# Patient Record
Sex: MALE | Race: OTHER | URBAN - METROPOLITAN AREA
[De-identification: names, ages, dates, MRNs, and addresses within clinical notes are randomized per-mention and may not be internally consistent; named-entity substitution may affect disease eponyms.]

---

## 2018-03-22 ENCOUNTER — OFFICE VISIT (OUTPATIENT)
Dept: FAMILY MEDICINE CLINIC | Age: 33
End: 2018-03-22

## 2018-03-22 VITALS
BODY MASS INDEX: 28.49 KG/M2 | TEMPERATURE: 97.7 F | SYSTOLIC BLOOD PRESSURE: 111 MMHG | WEIGHT: 188 LBS | DIASTOLIC BLOOD PRESSURE: 81 MMHG | HEART RATE: 58 BPM | HEIGHT: 68 IN

## 2018-03-22 DIAGNOSIS — G51.0 BELL'S PALSY: Primary | ICD-10-CM

## 2018-03-22 NOTE — PROGRESS NOTES
AVS printed and reviewed. Pt shows meds from [facility] ordered on 3/14/18 - Prednisone 20 mg empty bottle, Acyclovir 1 gm with 3 tabs left, eye drops and eye ointment. No refills ordered. F/u as needed or if symptoms worsen or do not improve. Discussion assisted by CAV , Saman Marin.

## 2018-03-22 NOTE — MR AVS SNAPSHOT
[address] [place] [address] 
[phone] Patient: Naila Gurrola MRN: [id number] Visit Information Catalina Mahoney y Kaylene Personal Médico Departamento Teléfono del Dep. Número de visita 3/22/2018  9:30 AM ELISHA Cobb [phone] Follow-up Instructions Return if symptoms worsen or fail to improve. Upcoming Health Maintenance Date Due DTaP/Tdap/Td series (1 - Tdap) 1/31/2008 Influenza Age 5 to Adult 8/1/2017 Alergias  Review Complete El: 3/22/2018 Por: Kayleigh Alejandre A partir del:  3/22/2018 No Known Allergies Vacunas actuales Rexine Smart No hay ninguna vacuna archivada. No revisadas esta visita You Were Diagnosed With   
  
 Laney Nunez 's palsy    -  Primary ICD-10-CM: G51.0 ICD-9-CM: 351.0 Partes vitales PS Pulso Temperatura [place] ( percentil de crecimiento) Peso (percentil de crecimiento) BMI (IM)  
 111/81 (BP 1 Location: Right arm) (!) 58 97.7 °F (36.5 °C) (Oral) 5' 8.11\" (1.73 m) 188 lb (85.3 kg) 28.49 kg/m2 Estatus de tabaquísmo Former Smoker Historial de signos vitales BMI and BSA Data Body Mass Index Body Surface Area  
 28.49 kg/m 2 2.02 m 2 [facility] Name Phone [address] [place] [phone] Hall lista de medicamentos actualizada Aviso  As of 3/22/2018 10:26 AM  
 No se le ha recetado ningún medicamento. Instrucciones de seguimiento Return if symptoms worsen or fail to improve. Instrucciones para el Paciente Parálisis facial de : Instrucciones de cuidado - [ Bell's Palsy: Care Instructions ] Instrucciones de cuidado La parálisis facial de  es christopher parálisis o debilitamiento de los músculos de un lado de la pérez. Las personas con parálisis facial de  suelen tener caído un lado de la boca y les lefty trabajo cerrar por completo el lexus de addison mismo lado. La parálisis facial de  puede interferir también con el sentido del gusto. [place] sucede cuando se inflama un nervio de la pérez. La causa de la parálisis facial de  no es un ataque cerebral. No se conoce la causa de esta inflamación del nervio. Anyi algunos expertos piensan que la causa podría ser un virus. Debido a esto, en ocasiones los médicos recetan un medicamento antiviral para tratarla. También podrían darle medicamentos para reducir la hinchazón. La parálisis facial de  por lo general mejora por sí marc en algunas semanas o meses. La atención de seguimiento es christopher parte clave de lay tratamiento y seguridad. Asegúrese de hacer y acudir a todas las citas, y llame a lay médico si está teniendo problemas. También es christopher buena idea saber los resultados de los exámenes y mantener christopher lista de los medicamentos que huang. Cómo puede cuidarse en el hogar? · [employer] medicamentos exactamente lonnie le fueron recetados. Llame a lay médico si bakari estar teniendo problemas con lay medicamento. Recibirá [employer] medicamentos específicos recetados por lay médico. 
· Use lágrimas artificiales o pomada si se le secan demasiado los ojos. La parálisis facial de  puede causar la caída del párpado inferior, lo que produce sequedad en el lexus. · Si no puede cerrar el lexus por completo, piense en usar un parche para dormir. · Ayúdese a parpadear usando un dedo para cerrar y abrir el párpado. [place] podría ayudar a mantener el lexus húmedo. · Use anteojos o gafas para prevenir que el polvo y la ulices entren en el lexus. · A medida que recupera la sensación en la pérez, masajee la frente, las mejillas y los labios. El masaje podría fortalecer los músculos de la pérez. · Cepíllese los dientes y use hilo dental con frecuencia para ayudar a prevenir las caries. La parálisis facial de  puede secar la saliva en un lado de lay boca. [place] aumenta el riesgo de caries. Cuándo debe pedir ayuda? Llame al 911 en cualquier momento que considere que necesita atención de [place]. Por ejemplo, llame si: 
? · Tiene síntomas de un ataque cerebral. Estos pueden incluir: 
¨ Entumecimiento, hormigueo, debilidad o parálisis repentinos en la pérez, el brazo o la pierna, sobre todo si ocurre en un solo lado del cuerpo. ¨ Cambios súbitos en la vista. ¨ Problemas repentinos para hablar. ¨ Confusión súbita o dificultad repentina para comprender frases sencillas. ¨ Problemas repentinos para caminar o mantener el equilibrio. ¨ Un dolor de monet intenso y repentino, distinto a los estrella de monet anteriores. ?Llame a lay médico ahora mismo o busque atención médica inmediata si: 
? · Siente entumecimiento o debilidad que se expande más allá de un lado de la pérez. ? · Tiene un salpullido en la piel o dolor o enrojecimiento en el lexus, o le [employer]. ? · Tiene nuevo dolor de monet o ines empeora. ?Preste especial atención a los cambios en lay funmi y asegúrese de comunicarse con lay médico si: 
? · No mejora lonnie se esperaba. Dónde puede encontrar más información en inglés? [place] a [email or web address] Kay Cruz P168 en la búsqueda para aprender más acerca de \"Parálisis facial de : Instrucciones de cuidado - [ Bell's Palsy: Care Instructions ]. \" 
Revisado: 14 octubre, 2016 Versión del contenido: 11.4 © 5337-4236 Healthwise, [email or web address]. Las instrucciones de cuidado fueron adaptadas bajo licencia por Good [employer]ctions (which disclaims liability or warranty for this information). Si usted tiene [place] afección médica o sobre estas instrucciones, siempre pregunte a lay profesional de funmi.  [employer], [email or web address] niega toda garantía o responsabilidad por lay uso de esta información. Introducing [address] [facility] introduce portal paciente MyChart . Ahora se puede acceder a partes de lay expediente médico, enviar por correo electrónico la oficina de lay médico y solicitar renovaciones de medicamentos en línea. En lay navegador de Internet , Stiven Handy a [email or web address] Evelio clic en el usuario por Lazaro Albert? Raza Moreno clic aquí en la sesión Koreen Handsome. Verá la página de registro [place]. Ingrese lay código de [facility] tom y lonnie aparece a continuación. Usted no tendrá que [employer] código después de keily completado el proceso de registro . Si usted no se inscribe antes de la fecha de caducidad , debe solicitar un nuevo código. · MyChart Código de acceso : [id number] Expires: 6/20/2018 10:06 AM 
 
Ingresa los últimos cuatro dígitos de lay Número de Seguro Social ( xxxx ) y fecha de nacimiento ( dd / mm / aaaa ) lonnie se indica y evelio clic en Enviar. Usted será llevado a la siguiente página de registro . Crear un ID MyChart . Esta será lay ID de inicio de sesión de MyChart y no puede ser [place] , por lo que pensar en christopher que es Jhonathan Carp y fácil de recordar . Crear christopher contraseña MyChart . Usted puede cambiar lay contraseña en cualquier momento . Ingrese lay Password Reset de preguntas y [place] . Rodanthe se puede utilizar en un momento posterior si usted olvida lay contraseña. Introduzca lay dirección de correo electrónico . Tera Crandall recibirá christopher notificación por correo electrónico cuando la nueva información está disponible en MyChart . Angella cruz en Registrarse. Coretha Bible mercedes y descargar porciones de lay expediente médico. 
Eevlio anthony en el enlace de descarga del menú Resumen para descargar christopher copia portátil de lay información médica . Si tiene [employer] , por favor visite la sección de preguntas frecuentes del sitio web MyChart .  Recuerde, MyChart NO es que se utilizará para las [employer]. Para emergencias médicas , llame al 911 . Ahora disponible en lay iPhone y Android ! Por favor proporcione ines resumen de la documentación de cuidado a lay próximo proveedor. If you have any questions after today's visit, please call [phone].

## 2018-03-22 NOTE — PROGRESS NOTES
Assessment/Plan:    Diagnoses and all orders for this visit:    1. Bell's palsy    He has eye patch and eye drops  Discussed natural course of this problem  F/up if not improved after 1 month from onset of sxs (today is day 8)    Follow-up Disposition:  Return if symptoms worsen or fail to improve. MITALI Beaulieu expressed understanding of this plan. An AVS was printed and given to the patient.      ----------------------------------------------------------------------    Chief Complaint   Patient presents with    Facial Droop     pt c/o pain and facial droop since Monday. Pt went to ED and is f/u on this problem. History of Present Illness:  Pt here for f/up on bells palsy after ER visit. sxs started 8 days ago. Started upon awakening in the morning and realizing that he could not smile normally. Left side of face with droop. Had all the appropriate tx and recommendations at the ER. Here bc he does not see improvement in his sxs yet. He has eye patch for sleeping and eye drops for the day time. He has been careful chewing       No past medical history on file. No Known Allergies    Social History   Substance Use Topics    Smoking status: Former Smoker     Types: Cigarettes    Smokeless tobacco: Never Used    Alcohol use Yes      Comment: occ       No family history on file. Physical Exam:     Visit Vitals    /81 (BP 1 Location: Right arm)    Pulse (!) 58    Temp 97.7 °F (36.5 °C) (Oral)    Ht 5' 8.11\" (1.73 m)    Wt 188 lb (85.3 kg)    BMI 28.49 kg/m2     Pleasant, not acute  A&Ox3  WDWN NAD  Respirations normal and non labored  Left facies asymmetrical with mild droop. Eye lid not lagging to show redness, only obvious with slower to blink. Smile not symmetrical with effort.    Sensation is intact to face

## 2018-03-22 NOTE — PATIENT INSTRUCTIONS
Parálisis facial de : Instrucciones de cuidado - [ Bell's Palsy: Care Instructions ]  Instrucciones de cuidado    La parálisis facial de  es christopher parálisis o debilitamiento de los músculos de un lado de la pérez. Las personas con parálisis facial de  suelen tener caído un lado de la boca y les lefty trabajo cerrar por completo el lexus de addison mismo lado. La parálisis facial de  puede interferir también con el sentido del gusto. [place] sucede cuando se inflama un nervio de la pérez. La causa de la parálisis facial de  no es un ataque cerebral. No se conoce la causa de esta inflamación del nervio. Anyi algunos expertos piensan que la causa podría ser un virus. Debido a esto, en ocasiones los médicos recetan un medicamento antiviral para tratarla. También podrían darle medicamentos para reducir la hinchazón. La parálisis facial de  por lo general mejora por sí marc en algunas semanas o meses. La atención de seguimiento es christopher parte clave de lay tratamiento y seguridad. Asegúrese de hacer y acudir a todas las citas, y llame a lay médico si está teniendo problemas. También es christopher buena idea saber los resultados de los exámenes y mantener christopher lista de los medicamentos que huang. ¿Cómo puede cuidarse en el hogar? · [employer] medicamentos exactamente lonnie le fueron recetados. Llame a lay médico si bakari estar teniendo problemas con lay medicamento. Recibirá [employer] medicamentos específicos recetados por lay médico.  · Use lágrimas artificiales o pomada si se le secan demasiado los ojos. La parálisis facial de  puede causar la caída del párpado inferior, lo que produce sequedad en el lexus. · Si no puede cerrar el lexus por completo, piense en usar un parche para dormir. · Ayúdese a parpadear usando un dedo para cerrar y abrir el párpado. [place] podría ayudar a mantener el lexus húmedo. · Use anteojos o gafas para prevenir que el polvo y la ulices entren en el lexus.   · A medida que recupera la sensación en la pérez, masajee la frente, las mejillas y los labios. El masaje podría fortalecer los músculos de la éprez. · Cepíllese los dientes y use hilo dental con frecuencia para ayudar a prevenir las caries. La parálisis facial de  puede secar la saliva en un lado de lay boca. [place] aumenta el riesgo de caries. ¿Cuándo debe pedir ayuda? Llame al 911 en cualquier momento que considere que necesita atención de [place]. Por ejemplo, llame si:  ? · Tiene síntomas de un ataque cerebral. Estos pueden incluir:  ¨ Entumecimiento, hormigueo, debilidad o parálisis repentinos en la pérez, el brazo o la pierna, sobre todo si ocurre en un solo lado del cuerpo. ¨ Cambios súbitos en la vista. ¨ Problemas repentinos para hablar. ¨ Confusión súbita o dificultad repentina para comprender frases sencillas. ¨ Problemas repentinos para caminar o mantener el equilibrio. ¨ Un dolor de monet intenso y repentino, distinto a los estrella de monet anteriores. ?Llame a lay médico ahora mismo o busque atención médica inmediata si:  ? · Siente entumecimiento o debilidad que se expande más allá de un lado de la pérez. ? · Tiene un salpullido en la piel o dolor o enrojecimiento en el lexus, o le [employer]. ? · Tiene nuevo dolor de monet o ines empeora. ?Preste especial atención a los cambios en lay funmi y asegúrese de comunicarse con lay médico si:  ? · No mejora lonnie se esperaba. ¿Dónde puede encontrar más información en inglés? Zeny Lower a http://[email or web address].info/. Rosita Carvalho P168 en la búsqueda para aprender más acerca de \"Parálisis facial de : Instrucciones de cuidado - [ Bell's Palsy: Care Instructions ]. \"  Revisado: 14 octubre, 2016  Versión del contenido: 11.4  © 8458-4600 Healthwise, [place] [employer] [place]. Las instrucciones de cuidado fueron adaptadas bajo licencia por Good Help Connections (which disclaims liability or warranty for this information).  Si usted tiene preguntas sobre christopher afección [place] o sobre estas instrucciones, siempre pregunte a lay profesional de funmi. [facility] niega toda garantía o responsabilidad por lay uso de esta información.

## 2018-11-08 ENCOUNTER — CLINICAL SUPPORT (OUTPATIENT)
Dept: FAMILY MEDICINE CLINIC | Age: 33
End: 2018-11-08

## 2018-11-08 DIAGNOSIS — Z23 ENCOUNTER FOR IMMUNIZATION: Primary | ICD-10-CM
